# Patient Record
Sex: FEMALE | Race: WHITE | URBAN - METROPOLITAN AREA
[De-identification: names, ages, dates, MRNs, and addresses within clinical notes are randomized per-mention and may not be internally consistent; named-entity substitution may affect disease eponyms.]

---

## 2017-01-09 ENCOUNTER — HOSPITAL ENCOUNTER (OUTPATIENT)
Dept: MAMMOGRAPHY | Facility: HOSPITAL | Age: 47
Discharge: HOME | End: 2017-01-09
Attending: OBSTETRICS & GYNECOLOGY

## 2017-01-09 DIAGNOSIS — Z12.31 VISIT FOR SCREENING MAMMOGRAM: ICD-10-CM

## 2017-02-06 ENCOUNTER — RECORDS - HEALTHEAST (OUTPATIENT)
Dept: ADMINISTRATIVE | Facility: OTHER | Age: 47
End: 2017-02-06

## 2017-09-18 ENCOUNTER — RECORDS - HEALTHEAST (OUTPATIENT)
Dept: ADMINISTRATIVE | Facility: OTHER | Age: 47
End: 2017-09-18

## 2018-10-15 ENCOUNTER — RECORDS - HEALTHEAST (OUTPATIENT)
Dept: ADMINISTRATIVE | Facility: OTHER | Age: 48
End: 2018-10-15

## 2018-10-16 ENCOUNTER — COMMUNICATION - HEALTHEAST (OUTPATIENT)
Dept: SCHEDULING | Facility: CLINIC | Age: 48
End: 2018-10-16

## 2020-03-10 ENCOUNTER — RECORDS - HEALTHEAST (OUTPATIENT)
Dept: MAMMOGRAPHY | Facility: CLINIC | Age: 50
End: 2020-03-10

## 2020-03-10 DIAGNOSIS — Z12.31 ENCOUNTER FOR SCREENING MAMMOGRAM FOR MALIGNANT NEOPLASM OF BREAST: ICD-10-CM

## 2020-11-29 ENCOUNTER — VIRTUAL VISIT (OUTPATIENT)
Dept: FAMILY MEDICINE | Facility: OTHER | Age: 50
End: 2020-11-29
Payer: COMMERCIAL

## 2020-11-29 PROCEDURE — 99421 OL DIG E/M SVC 5-10 MIN: CPT | Performed by: NURSE PRACTITIONER

## 2020-11-29 NOTE — PROGRESS NOTES
"Date: 2020 16:34:43  Clinician: Pushpa Mendoza  Clinician NPI: [id number]  Patient: Paz Zhao  Patient : 1970  Patient Address: [address] [place] [address]  Patient Phone: [phone]  Visit Protocol: UTI  Patient Summary:  Paz is a 50 year old ( : 1970 ) female who initiated a [facility] Visit for a presumed bladder infection. When asked the question \"Please sign me up to receive news, health information and promotions from [facility].\", Paz responded \"No\".   Her symptoms started 1-3 days ago and consist of urinary frequency, urgency, and feeling as if the bladder is never empty.   Symptom details   Urine color: Her urine does not have any color.    Denied symptoms include flank pain, abdominal pain, chills, urinary incontinence, vomiting, vaginal itching, dysuria, foul-smelling urine, nausea, and vaginal discharge. She does not feel feverish.   Paz has not used any over-the-counter medications or home remedies to relieve her current symptoms.  Precipitating events  Paz denies having a sexually transmitted disease.  Pertinent medical history  Paz does not get yeast infections when she takes antibiotics. She has not experienced problems or side effects with any of the common antibiotics used to treat bladder infections.   Paz does not have a history of bladder infections or kidney stones. She does not have any other urologic conditions. Her provider has not told her she has advanced kidney disease. She has not used a catheter or been a patient in a hospital or nursing home in the past 2 weeks.  She denies having immunosuppressive conditions (e.g., chemotherapy, HIV, organ transplant, long-term use of steroids or other immunosuppressive medications, splenectomy). She does not have diabetes.   Paz does not smoke or use smokeless tobacco.   Reason for repeat visit for the same protocol within 24 hours:  I really feel like my condition is a UTI. My back is aching but I've always " struggled with hip and back pain. This is nothing new.  See the History of referred by protocol and completed visits section for details on previous visits (visits currently in queue to be diagnosed will not appear in this section).    MEDICATIONS: levothyroxine oral, liothyronine oral, ALLERGIES: NKDA  Clinician Response:  Dear Paz,  Based on the information you have provided, you likely have an acute urinary tract infection, also called a bladder infection. Bladder infections occur when bacteria from the outside of the body enters the urinary tract. Any part of the urinary system can be infected, but the bladder is the most common.  Medication information  I am prescribing:     Cephalexin (Keflex) 500 mg oral capsule. Take 1 capsule by mouth every 12 hours for 7 days. There are no refills with this prescription.   The medication I prescribed for your bladder infection is an antibiotic. Continue taking the medication until it is gone even if you feel better. If you get an upset stomach while taking antibiotics, taking the medication with food can help.   Yeast infections can be a common side effect of antibiotics. The most common symptom of a yeast infection is itchiness in and around the vagina. Other signs and symptoms include burning, redness, or a thick, white vaginal discharge that looks like cottage cheese and does not have a bad smell.  Self care  Urination helps to flush bacteria from the urinary tract. For this reason, drinking water and urinating often helps relieve some urinary symptoms and can decrease your risk of getting bladder infections in the future.  Other steps you can take to prevent future bladder infections include:     Wipe front to back after using the bathroom    Urinate after sexual intercourse    Avoid using deodorant sprays, douches, or powders in the vaginal area     When to seek care  Please make an appointment to be seen in a clinic or urgent care if any of the following occur:      You develop new symptoms or your symptoms become worse    You have medication side effects that make it difficult to take them as prescribed    Your symptoms do not improve within 1-2 days of starting treatment    You have symptoms of a bladder infection that return shortly after completing treatment     It is possible to have an allergic reaction to an antibiotic even if you have not had one in the past. If you notice a new rash, significant swelling, or difficulty breathing, stop taking this medication immediately and go to a clinic or urgent care.   Diagnosis: Acute uncomplicated bladder infection  Diagnosis ICD: N39.0  Prescription: cephalexin (Keflex) 500 mg oral capsule 14 capsule, 7 days supply. Take 1 capsule by mouth every 12 hours for 7 days. Refills: 0, Refill as needed: no, Allow substitutions: yes  Pharmacy: [id number] - [phone] - [address], [place] [address]

## 2021-01-15 ENCOUNTER — HEALTH MAINTENANCE LETTER (OUTPATIENT)
Age: 51
End: 2021-01-15

## 2021-05-24 ENCOUNTER — RECORDS - HEALTHEAST (OUTPATIENT)
Dept: ADMINISTRATIVE | Facility: CLINIC | Age: 51
End: 2021-05-24

## 2021-06-01 ENCOUNTER — RECORDS - HEALTHEAST (OUTPATIENT)
Dept: ADMINISTRATIVE | Facility: CLINIC | Age: 51
End: 2021-06-01

## 2021-06-02 ENCOUNTER — RECORDS - HEALTHEAST (OUTPATIENT)
Dept: ADMINISTRATIVE | Facility: CLINIC | Age: 51
End: 2021-06-02

## 2021-07-24 ENCOUNTER — HEALTH MAINTENANCE LETTER (OUTPATIENT)
Age: 51
End: 2021-07-24

## 2021-09-19 ENCOUNTER — HEALTH MAINTENANCE LETTER (OUTPATIENT)
Age: 51
End: 2021-09-19

## 2022-03-05 ENCOUNTER — HEALTH MAINTENANCE LETTER (OUTPATIENT)
Age: 52
End: 2022-03-05

## 2022-08-20 ENCOUNTER — HEALTH MAINTENANCE LETTER (OUTPATIENT)
Age: 52
End: 2022-08-20

## 2022-11-20 ENCOUNTER — HEALTH MAINTENANCE LETTER (OUTPATIENT)
Age: 52
End: 2022-11-20

## 2023-04-15 ENCOUNTER — HEALTH MAINTENANCE LETTER (OUTPATIENT)
Age: 53
End: 2023-04-15